# Patient Record
Sex: FEMALE | Race: BLACK OR AFRICAN AMERICAN | NOT HISPANIC OR LATINO | URBAN - METROPOLITAN AREA
[De-identification: names, ages, dates, MRNs, and addresses within clinical notes are randomized per-mention and may not be internally consistent; named-entity substitution may affect disease eponyms.]

---

## 2022-12-23 ENCOUNTER — EMERGENCY (EMERGENCY)
Facility: HOSPITAL | Age: 22
LOS: 1 days | Discharge: HOME | End: 2022-12-23
Attending: STUDENT IN AN ORGANIZED HEALTH CARE EDUCATION/TRAINING PROGRAM | Admitting: STUDENT IN AN ORGANIZED HEALTH CARE EDUCATION/TRAINING PROGRAM
Payer: COMMERCIAL

## 2022-12-23 VITALS
SYSTOLIC BLOOD PRESSURE: 105 MMHG | DIASTOLIC BLOOD PRESSURE: 67 MMHG | OXYGEN SATURATION: 97 % | TEMPERATURE: 98 F | HEART RATE: 103 BPM | WEIGHT: 162.04 LBS | RESPIRATION RATE: 17 BRPM | HEIGHT: 70 IN

## 2022-12-23 LAB
ALBUMIN SERPL ELPH-MCNC: 4.6 G/DL — SIGNIFICANT CHANGE UP (ref 3.3–5)
ALP SERPL-CCNC: 58 U/L — SIGNIFICANT CHANGE UP (ref 40–120)
ALT FLD-CCNC: 23 U/L — SIGNIFICANT CHANGE UP (ref 10–45)
ANION GAP SERPL CALC-SCNC: 8 MMOL/L — SIGNIFICANT CHANGE UP (ref 5–17)
AST SERPL-CCNC: 28 U/L — SIGNIFICANT CHANGE UP (ref 10–40)
BASOPHILS # BLD AUTO: 0.05 K/UL — SIGNIFICANT CHANGE UP (ref 0–0.2)
BASOPHILS NFR BLD AUTO: 0.5 % — SIGNIFICANT CHANGE UP (ref 0–2)
BILIRUB SERPL-MCNC: 0.6 MG/DL — SIGNIFICANT CHANGE UP (ref 0.2–1.2)
BUN SERPL-MCNC: 15 MG/DL — SIGNIFICANT CHANGE UP (ref 7–23)
CALCIUM SERPL-MCNC: 9.5 MG/DL — SIGNIFICANT CHANGE UP (ref 8.4–10.5)
CHLORIDE SERPL-SCNC: 102 MMOL/L — SIGNIFICANT CHANGE UP (ref 96–108)
CO2 SERPL-SCNC: 26 MMOL/L — SIGNIFICANT CHANGE UP (ref 22–31)
CREAT SERPL-MCNC: 0.91 MG/DL — SIGNIFICANT CHANGE UP (ref 0.5–1.3)
EGFR: 91 ML/MIN/1.73M2 — SIGNIFICANT CHANGE UP
EOSINOPHIL # BLD AUTO: 0.16 K/UL — SIGNIFICANT CHANGE UP (ref 0–0.5)
EOSINOPHIL NFR BLD AUTO: 1.7 % — SIGNIFICANT CHANGE UP (ref 0–6)
GLUCOSE SERPL-MCNC: 90 MG/DL — SIGNIFICANT CHANGE UP (ref 70–99)
HCT VFR BLD CALC: 36.4 % — SIGNIFICANT CHANGE UP (ref 34.5–45)
HGB BLD-MCNC: 12.3 G/DL — SIGNIFICANT CHANGE UP (ref 11.5–15.5)
IMM GRANULOCYTES NFR BLD AUTO: 0.4 % — SIGNIFICANT CHANGE UP (ref 0–0.9)
LYMPHOCYTES # BLD AUTO: 1.44 K/UL — SIGNIFICANT CHANGE UP (ref 1–3.3)
LYMPHOCYTES # BLD AUTO: 15 % — SIGNIFICANT CHANGE UP (ref 13–44)
MCHC RBC-ENTMCNC: 29 PG — SIGNIFICANT CHANGE UP (ref 27–34)
MCHC RBC-ENTMCNC: 33.8 GM/DL — SIGNIFICANT CHANGE UP (ref 32–36)
MCV RBC AUTO: 85.8 FL — SIGNIFICANT CHANGE UP (ref 80–100)
MONOCYTES # BLD AUTO: 0.76 K/UL — SIGNIFICANT CHANGE UP (ref 0–0.9)
MONOCYTES NFR BLD AUTO: 7.9 % — SIGNIFICANT CHANGE UP (ref 2–14)
NEUTROPHILS # BLD AUTO: 7.16 K/UL — SIGNIFICANT CHANGE UP (ref 1.8–7.4)
NEUTROPHILS NFR BLD AUTO: 74.5 % — SIGNIFICANT CHANGE UP (ref 43–77)
NRBC # BLD: 0 /100 WBCS — SIGNIFICANT CHANGE UP (ref 0–0)
PLATELET # BLD AUTO: 218 K/UL — SIGNIFICANT CHANGE UP (ref 150–400)
POTASSIUM SERPL-MCNC: 4.5 MMOL/L — SIGNIFICANT CHANGE UP (ref 3.5–5.3)
POTASSIUM SERPL-SCNC: 4.5 MMOL/L — SIGNIFICANT CHANGE UP (ref 3.5–5.3)
PROT SERPL-MCNC: 7.4 G/DL — SIGNIFICANT CHANGE UP (ref 6–8.3)
RBC # BLD: 4.24 M/UL — SIGNIFICANT CHANGE UP (ref 3.8–5.2)
RBC # FLD: 11.9 % — SIGNIFICANT CHANGE UP (ref 10.3–14.5)
SARS-COV-2 RNA SPEC QL NAA+PROBE: NEGATIVE — SIGNIFICANT CHANGE UP
SODIUM SERPL-SCNC: 136 MMOL/L — SIGNIFICANT CHANGE UP (ref 135–145)
WBC # BLD: 9.61 K/UL — SIGNIFICANT CHANGE UP (ref 3.8–10.5)
WBC # FLD AUTO: 9.61 K/UL — SIGNIFICANT CHANGE UP (ref 3.8–10.5)

## 2022-12-23 PROCEDURE — 99283 EMERGENCY DEPT VISIT LOW MDM: CPT

## 2022-12-23 PROCEDURE — 87635 SARS-COV-2 COVID-19 AMP PRB: CPT

## 2022-12-23 PROCEDURE — 80053 COMPREHEN METABOLIC PANEL: CPT

## 2022-12-23 PROCEDURE — 36415 COLL VENOUS BLD VENIPUNCTURE: CPT

## 2022-12-23 PROCEDURE — 99284 EMERGENCY DEPT VISIT MOD MDM: CPT

## 2022-12-23 PROCEDURE — 85025 COMPLETE CBC W/AUTO DIFF WBC: CPT

## 2022-12-23 RX ORDER — ACETAMINOPHEN 500 MG
975 TABLET ORAL ONCE
Refills: 0 | Status: COMPLETED | OUTPATIENT
Start: 2022-12-23 | End: 2022-12-23

## 2022-12-23 RX ADMIN — Medication 975 MILLIGRAM(S): at 14:58

## 2022-12-23 NOTE — ED ADULT NURSE NOTE - OBJECTIVE STATEMENT
Patient came to ED after witnessed seizure. Patient hit face during having seizure. Patient has hematoma on forehead, Rt upper sangeetha swelling, dried blood noted around nose. Complaints of hematoma area, face pain.   Patient states she compliance with seizure meds and PMHX of Sz.  Denies SOB, cough, CP, neck pain, back pain at this time. No broken teeth noted. Denies any other injury.  As per patients, last seizure episode was in november. Rigid C collar in place by EMS. Patient came to ED after witnessed seizure. Patient hit face during having seizure. Patient has hematoma on forehead, Rt upper sangeetha swelling, dried blood noted around nose. Complaints of hematoma area, face pain.   Patient states she compliance with seizure meds and PMHX of Sz.  Denies SOB, cough, CP, neck pain, back pain at this time. No broken teeth noted. Denies any other injury. Denies urinary discomfort.  As per patients, last seizure episode was in november. Rigid C collar in place by EMS.
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
## 2022-12-23 NOTE — ED PROVIDER NOTE - NSFOLLOWUPINSTRUCTIONS_ED_ALL_ED_FT
YOU WERE SEEN TODAY FOR A BREAKTHROUGH SEIZURE. YOUR ELECTROLYTES AND SUGAR LEVELS WERE OK, PLEASE FOLLOW UP WITH YOUR NEUROLOGIST. CONTINUE TAKING YOUR ANTISEIZURE MEDICATION AS PRESCRIBED.    Seizure    A seizure is abnormal electrical activity in the brain; the specific cause may or may not be found. Prior to a seizure you may experience a warning sensation (aura) that may include fear, nausea, dizziness, and visual changes such as flashing lights of spots. Common symptoms during the seizure may include an altered mental status, rhythmic jerking movements, drooling, grunting, loss of bladder or bowel control, or tongue biting. After a seizure, you may feel confused and sleepy.     Do not swim, drive, operate machinery, or engage in any risky activity during which a seizure could cause further injury to you or others. Teach friends and family what to do if you HAVE a seizure which includes laying you on the ground with your head on a cushion and turning you to the side to keep your breathing passages clear in case of vomiting.    SEEK IMMEDIATE MEDICAL CARE IF YOU HAVE ANY OF THE FOLLOWING SYMPTOMS: seizure lasting over 5 minutes, not waking up or persistent altered mental status after the seizure, or more frequent or worsening seizures.
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
## 2022-12-23 NOTE — ED PROVIDER NOTE - PHYSICAL EXAMINATION
CONST: nontoxic NAD speaking in full sentences  HEAD: frontal hematoma  EYES: conjunctivae clear, PERRL, EOMI  ENT: upper lip swelling, small lac to inside of upper lip  NECK: no stepoffs nontender FROM, c-collar cleared on exam  CARD: rrr   CHEST: ctab no r/r/w, no stridor/retractions/tripoding  ABD: soft, nd, nttp, no rebound/guarding  EXT: FROM, symmetric distal pulses intact  SKIN: warm, dry, no rash, no pedal edema/ttp/rash, cap refill <2sec  NEURO: a+ox3, 5/5 strength x4, gross sensation intact x4, baseline gait

## 2022-12-23 NOTE — ED PROVIDER NOTE - PATIENT PORTAL LINK FT
You can access the FollowMyHealth Patient Portal offered by [facility] by registering at the following website: [email or web address]. By joining [employer]’s FollowMyHealth portal, you will also be able to view your health information using other applications (apps) compatible with our system.

## 2022-12-23 NOTE — ED ADULT TRIAGE NOTE - CHIEF COMPLAINT QUOTE
Pt BIBEMS from work after witnessed seizure Pt struck face, dried blood noted around nose and +hematoma on forehead. Endorses compliance with seizure meds, +hx of seizures. Last seizure in november. No AC use. Denies neck or back pain, rigid c collar in place by EMS.

## 2022-12-26 DIAGNOSIS — G40.919 EPILEPSY, UNSPECIFIED, INTRACTABLE, WITHOUT STATUS EPILEPTICUS: ICD-10-CM
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
## 2022-12-26 DIAGNOSIS — Z20.822 CONTACT WITH AND (SUSPECTED) EXPOSURE TO COVID-19: ICD-10-CM

## 2022-12-26 DIAGNOSIS — S00.83XA CONTUSION OF OTHER PART OF HEAD, INITIAL ENCOUNTER: ICD-10-CM

## 2022-12-26 DIAGNOSIS — R56.9 UNSPECIFIED CONVULSIONS: ICD-10-CM
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
## 2022-12-26 DIAGNOSIS — S01.511A LACERATION WITHOUT FOREIGN BODY OF LIP, INITIAL ENCOUNTER: ICD-10-CM

## 2023-01-03 ENCOUNTER — EMERGENCY (EMERGENCY)
Facility: HOSPITAL | Age: 23
LOS: 1 days | Discharge: HOME | End: 2023-01-03
Attending: EMERGENCY MEDICINE | Admitting: EMERGENCY MEDICINE
Payer: COMMERCIAL

## 2023-01-03 VITALS
DIASTOLIC BLOOD PRESSURE: 80 MMHG | HEART RATE: 105 BPM | WEIGHT: 162.04 LBS | OXYGEN SATURATION: 97 % | TEMPERATURE: 98 F | RESPIRATION RATE: 18 BRPM | HEIGHT: 70 IN | SYSTOLIC BLOOD PRESSURE: 125 MMHG

## 2023-01-03 VITALS
HEART RATE: 84 BPM | OXYGEN SATURATION: 99 % | TEMPERATURE: 98 F | SYSTOLIC BLOOD PRESSURE: 105 MMHG | RESPIRATION RATE: 18 BRPM | DIASTOLIC BLOOD PRESSURE: 69 MMHG

## 2023-01-03 LAB
ALBUMIN SERPL ELPH-MCNC: 5.1 G/DL — HIGH (ref 3.3–5)
ALP SERPL-CCNC: 58 U/L — SIGNIFICANT CHANGE UP (ref 40–120)
ALT FLD-CCNC: 75 U/L — HIGH (ref 10–45)
ANION GAP SERPL CALC-SCNC: 16 MMOL/L — SIGNIFICANT CHANGE UP (ref 5–17)
AST SERPL-CCNC: 69 U/L — HIGH (ref 10–40)
BASOPHILS # BLD AUTO: 0.07 K/UL — SIGNIFICANT CHANGE UP (ref 0–0.2)
BASOPHILS NFR BLD AUTO: 0.7 % — SIGNIFICANT CHANGE UP (ref 0–2)
BILIRUB SERPL-MCNC: 0.7 MG/DL — SIGNIFICANT CHANGE UP (ref 0.2–1.2)
BUN SERPL-MCNC: 13 MG/DL — SIGNIFICANT CHANGE UP (ref 7–23)
CALCIUM SERPL-MCNC: 9.4 MG/DL — SIGNIFICANT CHANGE UP (ref 8.4–10.5)
CHLORIDE SERPL-SCNC: 99 MMOL/L — SIGNIFICANT CHANGE UP (ref 96–108)
CO2 SERPL-SCNC: 21 MMOL/L — LOW (ref 22–31)
CREAT SERPL-MCNC: 0.97 MG/DL — SIGNIFICANT CHANGE UP (ref 0.5–1.3)
EGFR: 84 ML/MIN/1.73M2 — SIGNIFICANT CHANGE UP
EOSINOPHIL # BLD AUTO: 0.12 K/UL — SIGNIFICANT CHANGE UP (ref 0–0.5)
EOSINOPHIL NFR BLD AUTO: 1.3 % — SIGNIFICANT CHANGE UP (ref 0–6)
GLUCOSE SERPL-MCNC: 76 MG/DL — SIGNIFICANT CHANGE UP (ref 70–99)
HCT VFR BLD CALC: 41.3 % — SIGNIFICANT CHANGE UP (ref 34.5–45)
HGB BLD-MCNC: 13.6 G/DL — SIGNIFICANT CHANGE UP (ref 11.5–15.5)
IMM GRANULOCYTES NFR BLD AUTO: 0.5 % — SIGNIFICANT CHANGE UP (ref 0–0.9)
LYMPHOCYTES # BLD AUTO: 2.73 K/UL — SIGNIFICANT CHANGE UP (ref 1–3.3)
LYMPHOCYTES # BLD AUTO: 29.2 % — SIGNIFICANT CHANGE UP (ref 13–44)
MCHC RBC-ENTMCNC: 28.9 PG — SIGNIFICANT CHANGE UP (ref 27–34)
MCHC RBC-ENTMCNC: 32.9 GM/DL — SIGNIFICANT CHANGE UP (ref 32–36)
MCV RBC AUTO: 87.7 FL — SIGNIFICANT CHANGE UP (ref 80–100)
MONOCYTES # BLD AUTO: 0.9 K/UL — SIGNIFICANT CHANGE UP (ref 0–0.9)
MONOCYTES NFR BLD AUTO: 9.6 % — SIGNIFICANT CHANGE UP (ref 2–14)
NEUTROPHILS # BLD AUTO: 5.49 K/UL — SIGNIFICANT CHANGE UP (ref 1.8–7.4)
NEUTROPHILS NFR BLD AUTO: 58.7 % — SIGNIFICANT CHANGE UP (ref 43–77)
NRBC # BLD: 0 /100 WBCS — SIGNIFICANT CHANGE UP (ref 0–0)
PLATELET # BLD AUTO: 257 K/UL — SIGNIFICANT CHANGE UP (ref 150–400)
POTASSIUM SERPL-MCNC: 4.3 MMOL/L — SIGNIFICANT CHANGE UP (ref 3.5–5.3)
POTASSIUM SERPL-SCNC: 4.3 MMOL/L — SIGNIFICANT CHANGE UP (ref 3.5–5.3)
PROT SERPL-MCNC: 8.3 G/DL — SIGNIFICANT CHANGE UP (ref 6–8.3)
RBC # BLD: 4.71 M/UL — SIGNIFICANT CHANGE UP (ref 3.8–5.2)
RBC # FLD: 11.9 % — SIGNIFICANT CHANGE UP (ref 10.3–14.5)
SODIUM SERPL-SCNC: 136 MMOL/L — SIGNIFICANT CHANGE UP (ref 135–145)
WBC # BLD: 9.36 K/UL — SIGNIFICANT CHANGE UP (ref 3.8–10.5)
WBC # FLD AUTO: 9.36 K/UL — SIGNIFICANT CHANGE UP (ref 3.8–10.5)

## 2023-01-03 PROCEDURE — 85025 COMPLETE CBC W/AUTO DIFF WBC: CPT

## 2023-01-03 PROCEDURE — 99283 EMERGENCY DEPT VISIT LOW MDM: CPT

## 2023-01-03 PROCEDURE — 82962 GLUCOSE BLOOD TEST: CPT

## 2023-01-03 PROCEDURE — 99284 EMERGENCY DEPT VISIT MOD MDM: CPT

## 2023-01-03 PROCEDURE — 36415 COLL VENOUS BLD VENIPUNCTURE: CPT

## 2023-01-03 PROCEDURE — 80053 COMPREHEN METABOLIC PANEL: CPT

## 2023-01-03 NOTE — ED PROVIDER NOTE - CARE PROVIDER_API CALL
Ez Mcgill)  Neurology  [address] [place] [address]  Phone: [phone]  Fax: [phone]  Follow Up Time:

## 2023-01-03 NOTE — ED PROVIDER NOTE - CLINICAL SUMMARY MEDICAL DECISION MAKING FREE TEXT BOX
seizure disorder- 2nd seizure visit in 11 days- rec increasing keppra to 4 gm daily- px agrees  d/w Dr Adams- rec increasing keppra- they will fu with her tomorrow

## 2023-01-03 NOTE — ED ADULT NURSE NOTE - NSIMPLEMENTINTERV_GEN_ALL_ED
Implemented All Fall Risk Interventions:  [place] to call system. Call bell, personal items and telephone within reach. Instruct patient to call for assistance. Room bathroom lighting operational. Non-slip footwear when patient is off stretcher. Physically safe environment: no spills, clutter or unnecessary equipment. Stretcher in lowest position, wheels locked, appropriate side rails in place. Provide visual cue, wrist band, yellow gown, etc. Monitor gait and stability. Monitor for mental status changes and reorient to person, place, and time. Review medications for side effects contributing to fall risk. Reinforce activity limits and safety measures with patient and family.

## 2023-01-03 NOTE — ED PROVIDER NOTE - CONSTITUTIONAL, MLM
Well appearing, awake, alert, oriented to person, place, time/situation and in no apparent distress- healing bruise under R eye normal...
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
## 2023-01-03 NOTE — ED PROVIDER NOTE - CARE PROVIDERS DIRECT ADDRESSES
[email or web address]

## 2023-01-03 NOTE — ED PROVIDER NOTE - PATIENT PORTAL LINK FT
You can access the FollowMyHealth Patient Portal offered by [facility] by registering at the following website: [email or web address]. By joining [employer]’s FollowMyHealth portal, you will also be able to view your health information using other applications (apps) compatible with our system.

## 2023-01-03 NOTE — ED ADULT TRIAGE NOTE - CHIEF COMPLAINT QUOTE
BIBEMS, as per EMS patient had a seizure. last about a minute and was unresponsive for 5 mins. Patient currently awake and alert. History of seizures, last one was 3 weeks ago. Patient taking Keppra.

## 2023-01-03 NOTE — ED PROVIDER NOTE - NSFOLLOWUPINSTRUCTIONS_ED_ALL_ED_FT
Seizure, Adult      A seizure is a sudden burst of abnormal electrical and chemical activity in the brain. Seizures usually last from 30 seconds to 2 minutes. The abnormal activity temporarily interrupts normal brain function.    Many types of seizures can affect adults. A seizure can cause many different symptoms depending on where in the brain it starts.      What are the causes?    Common causes of this condition include:  •Fever or infection.      •Brain injury, head trauma, bleeding in the brain, or a brain tumor.      •Low levels of blood sugar or salt (sodium).      •Kidney problems or liver problems.      •Metabolic disorders or other conditions that are passed from parent to child (are inherited).      •Reaction to a substance, such as a drug or a medicine, or suddenly stopping the use of a substance (withdrawal).      •A stroke.      •Developmental disorders such as autism spectrum disorder or cerebral palsy.      In some cases, the cause of a seizure may not be known. Some people who have a seizure never have another one. A person who has repeated seizures over time without a clear cause has a condition called epilepsy.      What increases the risk?    You are more likely to develop this condition if:  •You have a family history of epilepsy.      •You have had a tonic–clonic seizure before. This type of seizure causes tightening (contraction) of the muscles of the whole body and loss of consciousness.      •You have a history of head trauma, lack of oxygen at birth, or strokes.        What are the signs or symptoms?    There are many different types of seizures. The symptoms vary depending on the type of seizure you have. Symptoms occur during the seizure. They may also occur before a seizure (aura) and after a seizure (postictal). Symptoms may include the following:    Symptoms during a seizure     •Uncontrollable shaking (convulsions) with fast, jerky movements of muscles.      •Stiffening of the body.      •Breathing problems.      •Confusion, staring, or unresponsiveness.      •Head nodding, eye blinking or fluttering, or rapid eye movements.      •Drooling, grunting, or making clicking sounds with your mouth.      •Loss of bladder control and bowel control.      Symptoms before a seizure     •Fear or anxiety.      •Nausea.    •Vertigo. This is a feeling like:  •You are moving when you are not.      •Your surroundings are moving when they are not.        •Déjà vu. This is a feeling of having seen or heard something before.      •Odd tastes or smells.      •Changes in vision, such as seeing flashing lights or spots.      Symptoms after a seizure     •Confusion.      •Sleepiness.      •Headache.      •Sore muscles.        How is this diagnosed?    This condition may be diagnosed based on:  •A description of your symptoms. Video of your seizures can be helpful.      •Your medical history.      •A physical exam.      You may also have tests, including:  •Blood tests.      •CT scan.      •MRI.      •Electroencephalogram (EEG). This test measures electrical activity in the brain. An EEG can predict whether seizures will return.      •A spinal tap, also called a lumbar puncture. This is the removal and testing of fluid that surrounds the brain and spinal cord.        How is this treated?    Most seizures will stop on their own in less than 5 minutes, and no treatment is needed. Seizures that last longer than 5 minutes will usually need treatment.    Seizures may be treated with:  •Medicines given through an IV.      •Avoiding known triggers, such as medicines that you take for another condition.      •Medicines to control seizures or prevent future seizures (antiepileptics), if epilepsy caused your seizures.      •Medical devices to prevent and control seizures.      •Surgery to stop seizures or to reduce how often seizures happen, if you have epilepsy that does not respond to medicines.      •A diet low in carbohydrates and high in fat (ketogenic diet).        Follow these instructions at home:    Medicines     •Take over-the-counter and prescription medicines only as told by your health care provider.      •Avoid any substances that may prevent your medicine from working properly, such as alcohol.      Activity     •Follow instructions about activities, such as driving or swimming, that would be dangerous if you had another seizure. Wait until your health care provider says it is safe to do them.      •If you live in the [place] check with your local department of motor vehicles (DMV) to find out about local driving laws. Each state has specific rules about when you can legally drive again.      •Get enough rest. Lack of sleep can make seizures more likely to occur.        Educating others    •Teach friends and family what to do if you have a seizure. They should:  •Help you get down to the ground, to prevent a fall.      •Cushion your head and move items away from your body.      •Loosen any tight clothing around your neck.      •Turn you on your side. If you vomit, this helps keep your airway clear.      •Know whether or not you need emergency care.      •Stay with you until you recover.      •Also, tell them what not to do if you have a seizure. Tell them:  •They should not hold you down. Holding you down will not stop the seizure.      •They should not put anything in your mouth.        General instructions     •Avoid anything that has ever triggered a seizure for you.      •Keep a seizure diary. Record what you remember about each seizure, especially anything that might have triggered it.      •Keep all follow-up visits. This is important.        Contact a health care provider if:    •You have another seizure or seizures. Call each time you have a seizure.      •Your seizure pattern changes.      •You continue to have seizures with treatment.      •You have symptoms of an infection or illness. Either of these might increase your risk of having a seizure.      •You are unable to take your medicine.        Get help right away if:  •You have:  •A seizure that does not stop after 5 minutes.      •Several seizures in a row without a complete recovery between seizures.      •A seizure that makes it harder to breathe.      •A seizure that leaves you unable to speak or use a part of your body.        •You do not wake up right away after a seizure.      •You injure yourself during a seizure.      •You have confusion or pain right after a seizure.      These symptoms may represent a serious problem that is an emergency. Do not wait to see if the symptoms will go away. Get medical help right away. Call your local emergency services (911 in the [place] Do not drive yourself to the hospital.       Summary    •Seizures are caused by abnormal electrical and chemical activity in the brain. The activity disrupts normal brain function and can cause various symptoms.      •Seizures have many causes, including illness, head injuries, low levels of blood sugar or salt, and certain conditions.      •Most seizures will stop on their own in less than 5 minutes. Seizures that last longer than 5 minutes are a medical emergency and need treatment right away.      •Many medicines are used to treat seizures. Take over-the-counter and prescription medicines only as told by your health care provider.      This information is not intended to replace advice given to you by your health care provider. Make sure you discuss any questions you have with your health care provider.      Document Revised: 06/25/2021 Document Reviewed: 06/25/2021    [employer] Patient Education © 2022 [employer]

## 2023-01-05 DIAGNOSIS — R56.9 UNSPECIFIED CONVULSIONS: ICD-10-CM

## 2023-01-05 PROBLEM — Z00.00 ENCOUNTER FOR PREVENTIVE HEALTH EXAMINATION: Status: ACTIVE | Noted: 2023-01-05

## 2023-01-12 ENCOUNTER — NON-APPOINTMENT (OUTPATIENT)
Age: 23
End: 2023-01-12

## 2023-01-12 ENCOUNTER — APPOINTMENT (OUTPATIENT)
Dept: NEUROLOGY | Facility: CLINIC | Age: 23
End: 2023-01-12
Payer: COMMERCIAL

## 2023-01-12 VITALS
WEIGHT: 159 LBS | TEMPERATURE: 98.6 F | BODY MASS INDEX: 22.26 KG/M2 | HEIGHT: 70.87 IN | DIASTOLIC BLOOD PRESSURE: 88 MMHG | OXYGEN SATURATION: 100 % | HEART RATE: 97 BPM | SYSTOLIC BLOOD PRESSURE: 118 MMHG

## 2023-01-12 DIAGNOSIS — Z78.9 OTHER SPECIFIED HEALTH STATUS: ICD-10-CM

## 2023-01-12 DIAGNOSIS — Z63.5 DISRUPTION OF FAMILY BY SEPARATION AND DIVORCE: ICD-10-CM

## 2023-01-12 DIAGNOSIS — R56.9 UNSPECIFIED CONVULSIONS: ICD-10-CM

## 2023-01-12 DIAGNOSIS — F17.210 NICOTINE DEPENDENCE, CIGARETTES, UNCOMPLICATED: ICD-10-CM

## 2023-01-12 PROCEDURE — 99205 OFFICE O/P NEW HI 60 MIN: CPT

## 2023-01-12 SDOH — SOCIAL STABILITY - SOCIAL INSECURITY: DISRUPTION OF FAMILY BY SEPARATION AND DIVORCE: Z63.5

## 2023-01-18 NOTE — ASSESSMENT
[FreeTextEntry1] : 1) Recommending inpatient workup for VEEG, MRI, and med changes, if patient is willing to extend her stay in [place]\par \par \par \par Treatment, side effects and dosages were discussed in detail.\par \par Risk of allergic reactions, mood changes, lab etc discussed in details.\par \par Seizure precautions discussed in detail including restrictions on driving, machinery, etc. \par \par Seizure triggers including ETOH intake, drugs discussed in detail\par \par Patient questions answered.\par

## 2023-01-18 NOTE — REVIEW OF SYSTEMS
[As Noted in HPI] : as noted in HPI [Seizures] : convulsions [Anxiety] : anxiety [Emotional Problems] : emotional problems [Fever] : no fever [Chills] : no chills [Feeling Poorly] : not feeling poorly [Feeling Tired] : not feeling tired [Chest Pain] : no chest pain [Palpitations] : no palpitations [Shortness Of Breath] : no shortness of breath [Abdominal Pain] : no abdominal pain [de-identified] : seizures

## 2023-01-18 NOTE — HISTORY OF PRESENT ILLNESS
[FreeTextEntry1] : Patient is a 22 year old female presenting with seizures.\par \par History of first seizure in July 2019 in [place]. Hospitalized in coma for 3 days, diagnosed with "high emotional stress seizures". Put on Keppra, unclear initial dose.\par \par Officially diagnosed with epilepsy in 2021 at home in [place] by Dr. Jeff Watkins - pt still follows with him. He increased Keppra to 2000mg ER nightly. Seizures poorly controlled, pt notes "many" events over the years, especially if she misses a dose of Keppra. Inpatient EEG in [place] in March 2022 and July 2022. EEG showed LT slowing with 3 events recorded, but report unclear. Pt to email official reports. No MRI done.\par \par Moved to [place] in September 2022, works at a [employer] on the [facility]. Pt notes >10 seizures since moving to the [facility]. Had a seizure on 12/10, possibly related to emotional issues with her partner who she was living with at the time. Two other seizures on 12/23 and 1/3, both at work. The first witnessed on security camera and the second by a coworker. Both times pt sent to [facility] ED, pt discharged and recommended following up with neurologist. Pt called Dr. Watkins, who recommended increasing Keppra to 4000mg, but pt refused.\par \par Pt experiences auras before every seizure, describing them as a "flush" in her head spreading to her whole body, lasting several minutes. \par \par Sleep has been "off" since seizure on 12/10, but pt attributes this in part to issues with her partner.\par \par PMH significant for HSV diagnosed several months after seizure in 2019.\par \par Meds:\par Keppra 2000mg ER nightly\par \par Previously smoked marijuana daily, but stopped this month due to possible connection to her seizures. Drinks an occasional glass of wine each week. \par \par Pt's 1st cousin may have possible seizure disorder - currently seeking treatment / diagnosis. Mom did not know she was pregnant with pt until 3rd month, but otherwise normal pregnancy and delivery. \par \par Pt was previously living with partner, but moved out to live with a friend. She is considering moving back home to [place] at the end of the month. \par

## 2023-01-18 NOTE — DISCUSSION/SUMMARY
[FreeTextEntry1] : 22 year old female presenting with uncontrolled seizures, etiology unknown. EEG in 2022 with LT slowing and 3 events recorded, but report unclear. No imaging.

## 2023-01-18 NOTE — REVIEW OF SYSTEMS
[As Noted in HPI] : as noted in HPI [Seizures] : convulsions [Anxiety] : anxiety [Emotional Problems] : emotional problems [Fever] : no fever [Chills] : no chills [Feeling Poorly] : not feeling poorly [Feeling Tired] : not feeling tired [Chest Pain] : no chest pain [Palpitations] : no palpitations [Shortness Of Breath] : no shortness of breath [Abdominal Pain] : no abdominal pain [de-identified] : seizures

## 2023-01-18 NOTE — PHYSICAL EXAM
[General Appearance - Alert] : alert [General Appearance - In No Acute Distress] : in no acute distress [General Appearance - Well-Appearing] : healthy appearing [Oriented To Time, Place, And Person] : oriented to person, place, and time [Cranial Nerves Optic (II)] : visual acuity intact bilaterally,  visual fields full to confrontation, pupils equal round and reactive to light [Cranial Nerves Oculomotor (III)] : extraocular motion intact [Motor Strength] : muscle strength was normal in all four extremities [Sensation Tactile Decrease] : light touch was intact [Abnormal Walk] : normal gait [Balance] : balance was intact [2+] : Ankle jerk left 2+ [PERRL With Normal Accommodation] : pupils were equal in size, round, reactive to light, with normal accommodation [Extraocular Movements] : extraocular movements were intact [Full Visual Field] : full visual field [Motor Handedness Right-Handed] : the patient is right hand dominant [Paresis Pronator Drift Right-Sided] : no pronator drift on the right [Paresis Pronator Drift Left-Sided] : no pronator drift on the left [Motor Strength Upper Extremities Right] : strength was normal in the right upper extremity [Motor Strength Upper Extremities Left] : strength was normal in the left upper extremity [Motor Strength Lower Extremities Right] : strength was normal in the right lower extremity [Motor Strength Lower Extremities Left] : strength was normal in the left lower extremity

## 2023-01-18 NOTE — HISTORY OF PRESENT ILLNESS
[FreeTextEntry1] : Patient is a 22 year old female presenting with seizures.\par \par History of first seizure in July 2019 in [place]. Hospitalized in coma for 3 days, diagnosed with "high emotional stress seizures". Put on Keppra, unclear initial dose.\par \par Officially diagnosed with epilepsy in 2021 at home in [place] by Dr. Jeff Watkins - pt still follows with him. He increased Keppra to 2000mg ER nightly. Seizures poorly controlled, pt notes "many" events over the years, especially if she misses a dose of Keppra. Inpatient EEG in [place] in March 2022 and July 2022. EEG showed LT slowing with 3 events recorded, but report unclear. Pt to email official reports. No MRI done.\par \par Moved to [place] in September 2022, works at a [employer] on the [facility]. Pt notes >10 seizures since moving to the [facility]. Had a seizure on 12/10, possibly related to emotional issues with her partner who she was living with at the time. Two other seizures on 12/23 and 1/3, both at work. The first witnessed on security camera and the second by a coworker. Both times pt sent to [facility] ED, pt discharged and recommended following up with neurologist. Pt called Dr. Watkins, who recommended increasing Keppra to 4000mg, but pt refused.\par \par Pt experiences auras before every seizure, describing them as a "flush" in her head spreading to her whole body, lasting several minutes. \par \par Sleep has been "off" since seizure on 12/10, but pt attributes this in part to issues with her partner.\par \par PMH significant for HSV diagnosed several months after seizure in 2019.\par \par Meds:\par Keppra 2000mg ER nightly\par \par Previously smoked marijuana daily, but stopped this month due to possible connection to her seizures. Drinks an occasional glass of wine each week. \par \par Pt's 1st cousin may have possible seizure disorder - currently seeking treatment / diagnosis. Mom did not know she was pregnant with pt until 3rd month, but otherwise normal pregnancy and delivery. \par \par Pt was previously living with partner, but moved out to live with a friend. She is considering moving back home to [place] at the end of the month. \par

## 2023-01-29 ENCOUNTER — TRANSCRIPTION ENCOUNTER (OUTPATIENT)
Age: 23
End: 2023-01-29

## 2023-02-13 LAB — SARS-COV-2 N GENE NPH QL NAA+PROBE: DETECTED

## 2023-02-20 ENCOUNTER — FORM ENCOUNTER (OUTPATIENT)
Age: 23
End: 2023-02-20

## 2023-02-21 ENCOUNTER — INPATIENT (INPATIENT)
Facility: HOSPITAL | Age: 23
LOS: 2 days | Discharge: HOME | DRG: 101 | End: 2023-02-24
Attending: PSYCHIATRY & NEUROLOGY | Admitting: PSYCHIATRY & NEUROLOGY
Payer: COMMERCIAL

## 2023-02-21 VITALS
RESPIRATION RATE: 16 BRPM | WEIGHT: 161.82 LBS | HEART RATE: 80 BPM | SYSTOLIC BLOOD PRESSURE: 119 MMHG | HEIGHT: 70 IN | TEMPERATURE: 98 F | DIASTOLIC BLOOD PRESSURE: 80 MMHG | OXYGEN SATURATION: 99 %

## 2023-02-21 LAB
ALBUMIN SERPL ELPH-MCNC: 4.1 G/DL — SIGNIFICANT CHANGE UP (ref 3.3–5)
ALP SERPL-CCNC: 64 U/L — SIGNIFICANT CHANGE UP (ref 40–120)
ALT FLD-CCNC: 20 U/L — SIGNIFICANT CHANGE UP (ref 10–45)
ANION GAP SERPL CALC-SCNC: 7 MMOL/L — SIGNIFICANT CHANGE UP (ref 5–17)
AST SERPL-CCNC: 27 U/L — SIGNIFICANT CHANGE UP (ref 10–40)
BASOPHILS # BLD AUTO: 0.05 K/UL — SIGNIFICANT CHANGE UP (ref 0–0.2)
BASOPHILS NFR BLD AUTO: 0.6 % — SIGNIFICANT CHANGE UP (ref 0–2)
BILIRUB SERPL-MCNC: 0.8 MG/DL — SIGNIFICANT CHANGE UP (ref 0.2–1.2)
BUN SERPL-MCNC: 5 MG/DL — LOW (ref 7–23)
CALCIUM SERPL-MCNC: 9.5 MG/DL — SIGNIFICANT CHANGE UP (ref 8.4–10.5)
CHLORIDE SERPL-SCNC: 105 MMOL/L — SIGNIFICANT CHANGE UP (ref 96–108)
CO2 SERPL-SCNC: 28 MMOL/L — SIGNIFICANT CHANGE UP (ref 22–31)
CREAT SERPL-MCNC: 0.77 MG/DL — SIGNIFICANT CHANGE UP (ref 0.5–1.3)
EGFR: 111 ML/MIN/1.73M2 — SIGNIFICANT CHANGE UP
EOSINOPHIL # BLD AUTO: 0.12 K/UL — SIGNIFICANT CHANGE UP (ref 0–0.5)
EOSINOPHIL NFR BLD AUTO: 1.4 % — SIGNIFICANT CHANGE UP (ref 0–6)
GLUCOSE SERPL-MCNC: 111 MG/DL — HIGH (ref 70–99)
HCT VFR BLD CALC: 41.8 % — SIGNIFICANT CHANGE UP (ref 34.5–45)
HGB BLD-MCNC: 13.7 G/DL — SIGNIFICANT CHANGE UP (ref 11.5–15.5)
IMM GRANULOCYTES NFR BLD AUTO: 0.3 % — SIGNIFICANT CHANGE UP (ref 0–0.9)
LYMPHOCYTES # BLD AUTO: 2.13 K/UL — SIGNIFICANT CHANGE UP (ref 1–3.3)
LYMPHOCYTES # BLD AUTO: 24.3 % — SIGNIFICANT CHANGE UP (ref 13–44)
MAGNESIUM SERPL-MCNC: 1.9 MG/DL — SIGNIFICANT CHANGE UP (ref 1.6–2.6)
MCHC RBC-ENTMCNC: 29 PG — SIGNIFICANT CHANGE UP (ref 27–34)
MCHC RBC-ENTMCNC: 32.8 GM/DL — SIGNIFICANT CHANGE UP (ref 32–36)
MCV RBC AUTO: 88.6 FL — SIGNIFICANT CHANGE UP (ref 80–100)
MONOCYTES # BLD AUTO: 0.41 K/UL — SIGNIFICANT CHANGE UP (ref 0–0.9)
MONOCYTES NFR BLD AUTO: 4.7 % — SIGNIFICANT CHANGE UP (ref 2–14)
NEUTROPHILS # BLD AUTO: 6.01 K/UL — SIGNIFICANT CHANGE UP (ref 1.8–7.4)
NEUTROPHILS NFR BLD AUTO: 68.7 % — SIGNIFICANT CHANGE UP (ref 43–77)
NRBC # BLD: 0 /100 WBCS — SIGNIFICANT CHANGE UP (ref 0–0)
PHOSPHATE SERPL-MCNC: 3.3 MG/DL — SIGNIFICANT CHANGE UP (ref 2.5–4.5)
PLATELET # BLD AUTO: 228 K/UL — SIGNIFICANT CHANGE UP (ref 150–400)
POTASSIUM SERPL-MCNC: 4.3 MMOL/L — SIGNIFICANT CHANGE UP (ref 3.5–5.3)
POTASSIUM SERPL-SCNC: 4.3 MMOL/L — SIGNIFICANT CHANGE UP (ref 3.5–5.3)
PROT SERPL-MCNC: 7 G/DL — SIGNIFICANT CHANGE UP (ref 6–8.3)
RBC # BLD: 4.72 M/UL — SIGNIFICANT CHANGE UP (ref 3.8–5.2)
RBC # FLD: 11.7 % — SIGNIFICANT CHANGE UP (ref 10.3–14.5)
SODIUM SERPL-SCNC: 140 MMOL/L — SIGNIFICANT CHANGE UP (ref 135–145)
WBC # BLD: 8.75 K/UL — SIGNIFICANT CHANGE UP (ref 3.8–10.5)
WBC # FLD AUTO: 8.75 K/UL — SIGNIFICANT CHANGE UP (ref 3.8–10.5)

## 2023-02-21 PROCEDURE — 95816 EEG AWAKE AND DROWSY: CPT | Mod: 26

## 2023-02-21 PROCEDURE — 93010 ELECTROCARDIOGRAM REPORT: CPT

## 2023-02-21 PROCEDURE — 70551 MRI BRAIN STEM W/O DYE: CPT | Mod: 26

## 2023-02-21 PROCEDURE — 76377 3D RENDER W/INTRP POSTPROCES: CPT | Mod: 26

## 2023-02-21 RX ORDER — ENOXAPARIN SODIUM 100 MG/ML
40 INJECTION SUBCUTANEOUS EVERY 24 HOURS
Refills: 0 | Status: DISCONTINUED | OUTPATIENT
Start: 2023-02-21 | End: 2023-02-24

## 2023-02-21 RX ORDER — INFLUENZA VIRUS VACCINE 15; 15; 15; 15 UG/.5ML; UG/.5ML; UG/.5ML; UG/.5ML
0.5 SUSPENSION INTRAMUSCULAR ONCE
Refills: 0 | Status: DISCONTINUED | OUTPATIENT
Start: 2023-02-21 | End: 2023-02-24

## 2023-02-21 RX ORDER — IBUPROFEN 200 MG
600 TABLET ORAL EVERY 6 HOURS
Refills: 0 | Status: DISCONTINUED | OUTPATIENT
Start: 2023-02-21 | End: 2023-02-24

## 2023-02-21 RX ORDER — LANOLIN ALCOHOL/MO/W.PET/CERES
3 CREAM (GRAM) TOPICAL AT BEDTIME
Refills: 0 | Status: DISCONTINUED | OUTPATIENT
Start: 2023-02-21 | End: 2023-02-24

## 2023-02-21 RX ORDER — LEVETIRACETAM 250 MG/1
1000 TABLET, FILM COATED ORAL ONCE
Refills: 0 | Status: COMPLETED | OUTPATIENT
Start: 2023-02-21 | End: 2023-02-21

## 2023-02-21 RX ADMIN — ENOXAPARIN SODIUM 40 MILLIGRAM(S): 100 INJECTION SUBCUTANEOUS at 19:12

## 2023-02-21 RX ADMIN — Medication 600 MILLIGRAM(S): at 22:50

## 2023-02-21 RX ADMIN — Medication 600 MILLIGRAM(S): at 22:20

## 2023-02-21 RX ADMIN — LEVETIRACETAM 1000 MILLIGRAM(S): 250 TABLET, FILM COATED ORAL at 19:12

## 2023-02-21 NOTE — H&P ADULT - HISTORY OF PRESENT ILLNESS
22y F [facility] epilepsy (2021), HSV, presents for workup of uncontrolled seizures.  Ms. Reyes's first seizure was in July 2019 attributed to be 2/2 emotional stress in prior marriage after which she was intubated and sedated for 3 days and was started on Keppra (500mg BID). She was officially diagnosed with epilepsy in 2021 after an EEG study with Dr. Jeff Watkins in [place] at that time her keppra was increased to 2000mg ER qhs as she would often forget the BID dosing and still had seizures. She moved to [place]  9/2022 during which time underwent more significant life-stressors, would sometimes forget AED. Had 10 total seizures between September and December. In December 2022 had seizure witnessed on security footage at work: she felt unwell, took a nap and appeared to wake up, stretch arm then shook her body. She was brought to [facility] ED with recommendation to see her outpatient neuro in [place]--she spoke with him over the phone recommended to increase her Keppra to 4000mg BID but she declined.     Last seizure was 2/16. She has missed four keppra doses in the past 3 weeks. Her seizures are described by her partner as staring episodes with stuttered or slurred speech ?mouth chewing, biting tongue that occasional may progress to full body convulsion. Her aura is a full body "euphoric" flushing and lightheaded sensation.     She presents to [facility] to characterize if she truly has epilepsy, or if these seizures are provoked from stress. Denied recent illnesses. Endorsed rash on scalp related to hair products, today is her first day of menses. Denied lifetime tobacco use, endorsed 1-2 etoh per month, and marijuana every 4 months. Denied fever, chills, body weakness, visual disturbances, bugbites/tickbites, international travel.         PMHx:   PSHx: None  Meds: See med rec  Allergies: Cats  Social: see below   22y F [facility] epilepsy (2021), HSV, presents for workup of uncontrolled seizures.  Ms. Reyes's first seizure was in July 2019 attributed to be 2/2 emotional stress in prior marriage after which she was intubated and sedated for 3 days and was started on Keppra (500mg BID). She was officially diagnosed with epilepsy in 2021 after an EEG study with Dr. Jeff Watkins in [place] at that time her keppra was increased to 2000mg ER qhs as she would often forget the BID dosing and still had seizures. She moved to [place]  9/2022 during which time underwent more significant life-stressors, would sometimes forget AED. Had 10 total seizures between September and December. In December 2022 had seizure witnessed on security footage at work: she felt unwell, took a nap and appeared to wake up, stretch arm then shook her body. She was brought to [facility] ED with recommendation to see her outpatient neuro in [place]--she spoke with him over the phone recommended to increase her Keppra to 4000mg BID but she declined.     Last seizure was 2/16. She has missed four keppra doses in the past 3 weeks. Her seizures are described by her partner as staring episodes with stuttered or slurred speech ?mouth chewing, biting tongue that occasional may progress to full body convulsion. Her aura is a full body "euphoric" flushing and lightheaded sensation.     She presents to [facility] to characterize if she truly has epilepsy, or if these seizures are provoked from stress. Last Keppra dose was 2/19 evening (in preparation for this admission). Denied recent illnesses. Endorsed rash on scalp related to hair products, today is her first day of menses. Denied lifetime tobacco use, endorsed 1-2 etoh per month, and marijuana every 4 months. Denied fever, chills, body weakness, visual disturbances, bugbites/tickbites, international travel.         PMHx:   PSHx: None  Meds: See med rec  Allergies: Cats  Social: see below

## 2023-02-21 NOTE — PATIENT PROFILE ADULT - FALL HARM RISK - UNIVERSAL INTERVENTIONS
Bed in lowest position, wheels locked, appropriate side rails in place/Call bell, personal items and telephone in reach/Instruct patient to call for assistance before getting out of bed or chair/Non-slip footwear when patient is out of bed/[place] to call system/Physically safe environment - no spills, clutter or unnecessary equipment/Purposeful Proactive Rounding/Room/bathroom lighting operational, light cord in reach

## 2023-02-21 NOTE — H&P ADULT - NSHPSOCIALHISTORY_GEN_ALL_CORE
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 Lives in [place] with partner and several roommates, feels safe at home.

## 2023-02-21 NOTE — H&P ADULT - ATTENDING COMMENTS
Patient seen and examined by me on 2/22/2023.    22-year-old woman who presents for characterization of suspected seizures.  Episodes consist of feeling like she is on a roller coaster, fear, inability to speak, followed by loss of consciousness, shaking, and tongue biting.  EEG is normal thus far.  Will hold Keppra and continue recording.   Ideally we would like to record one of her typical seizure episodes, but recording interictal discharges in order to confirm and localize  her seizures would be helpful as well.

## 2023-02-21 NOTE — H&P ADULT - ASSESSMENT
22y F [facility] epilepsy (2021), HSV, presents as elective admission to EMU for characterization of breakthrough seizures. Seizures are most likely related to medication nonadherance, psychosocial stress.     Plan  #Seizure Characterization  - Admit to EMU  - vEEG x 72 hours  - f/u Keppra level  - Switch keppra to 1000mg once tonight  - Will decide on Keppra dosing after overnight EEG (hold morning dose)   - Seizure and fall  - Pending MRI Brain noncontrast   - Maintain Mag>2    #Misc  Fluids: None  Electrolytes: replete as necessary, K>4, Mg>2  Nutrition: Regular  Bowel Regimen: None  DVT ppx: Lovenox 40mg  GI ppx: None  Code: Full  Disposition: EMU     22y F [facility] epilepsy (2021), HSV (2020) presents for characterization of breakthrough seizures as elective admission to EMU. Seizures are most likely related to medication nonadherence psychosocial stress. Pending EEG to assess seizure risk and possible medication reduction.     Plan  #Seizure Characterization  - Admit to EMU  - vEEG x 72 hours  - f/u Keppra level  - Switch keppra to 1000mg once tonight  - Will decide on Keppra dosing after overnight EEG (hold morning dose)   - Pending MRI Brain noncontrast   - Maintain Mag>2  - Seizure and fall precautions  - Ativan 2mg IVP for seizure >2 min    #Misc  Fluids: None  Electrolytes: replete as necessary, K>4, Mg>2  Nutrition: Regular  Bowel Regimen: None  DVT ppx: Lovenox 40mg  GI ppx: None  Code: Full  Disposition: EMU

## 2023-02-21 NOTE — H&P ADULT - NSHPPHYSICALEXAM_GEN_ALL_CORE
T(C): 36.9 (02-21-23 @ 11:42), Max: 36.9 (02-21-23 @ 11:42)  HR: 80 (02-21-23 @ 11:42) (80 - 80)  BP: 119/80 (02-21-23 @ 11:42) (119/80 - 119/80)  RR: 16 (02-21-23 @ 11:42) (16 - 16)  SpO2: 99% (02-21-23 @ 11:42) (99% - 99%)    CONSTITUTIONAL: Well groomed, no apparent distress  EYES: PERRLA and symmetric, EOMI, No conjunctival or scleral injection, non-icteric  ENMT: Oral mucosa with moist membranes. Normal dentition; no pharyngeal injection or exudates             NECK: Supple, symmetric and without tracheal deviation   RESP: No respiratory distress, no use of accessory muscles; CTA b/l, no WRR  CV: RRR, +S1S2, no MRG; no JVD; no peripheral edema  GI: Soft, NT, ND, no rebound, no guarding; no palpable masses; no hepatosplenomegaly; no hernia palpated  LYMPH: No cervical LAD or tenderness; no axillary LAD or tenderness; no inguinal LAD or tenderness  MSK: Normal gait; No digital clubbing or cyanosis; examination of the (head/neck/spine/ribs/pelvis, RUE, LUE, RLE, LLE) without misalignment,            Normal ROM without pain, no spinal tenderness, normal muscle strength/tone  SKIN: Erythematous rash on posterior scalp    Neurologic:     -Mental Status: AAOx3. Speech is fluent with intact naming, repetition, and comprehension, no dysarthria. Recent and remote memory intact. Follows commands. Attention/concentration intact. Fund of knowledge appropriate.     -Cranial Nerves:          II: Visual fields are full to confrontation.          III, IV, VI: EOMI without nystagmus. PERRL b/l          V:  Facial sensation V1-V3 equal and intact           VII: Face is symmetric with normal eye closure and smile          VIII: Hearing is bilaterally intact to finger rub          IX, X: Uvula is midline and soft palate rises symmetrically          XI: Head turning and shoulder shrug are intact.          XII: Tongue protrudes midline     -Motor: Normal bulk and tone. Strength bilateral upper extremity 5/5, bilateral lower extremities 5/5.                     No pronator drift. Rapid alternating movements intact and symmetric     -Sensation: Intact to light touch bilaterally. No neglect or extinction on double simultaneous testing.     -Coordination: No dysmetria on finger-to-nose and heel-to-shin bilaterally     -Reflexes: 1+ Biceps 1+ BR 1+ Patellar 1+ Ankle. Downgoing toes bilaterally      -Gait: Narrow gait and steady T(C): 36.9 (02-21-23 @ 11:42), Max: 36.9 (02-21-23 @ 11:42)  HR: 80 (02-21-23 @ 11:42) (80 - 80)  BP: 119/80 (02-21-23 @ 11:42) (119/80 - 119/80)  RR: 16 (02-21-23 @ 11:42) (16 - 16)  SpO2: 99% (02-21-23 @ 11:42) (99% - 99%)    CONSTITUTIONAL: Well groomed, no apparent distress  EYES: PERRLA and symmetric, EOMI, No conjunctival or scleral injection, non-icteric  ENMT: Oral mucosa with moist membranes. Normal dentition; no pharyngeal injection or exudates             NECK: Supple, symmetric and without tracheal deviation   RESP: No respiratory distress, no use of accessory muscles; CTA b/l, no WRR  CV: RRR, +S1S2, no MRG; no JVD; no peripheral edema  GI: Soft, NT, ND, no rebound, no guarding; no palpable masses; no hepatosplenomegaly; no hernia palpated  LYMPH: No cervical LAD or tenderness; no axillary LAD or tenderness; no inguinal LAD or tenderness  MSK: Normal gait; No digital clubbing or cyanosis; examination of the (head/neck/spine/ribs/pelvis, RUE, LUE, RLE, LLE) without misalignment,            Normal ROM without pain, no spinal tenderness, normal muscle strength/tone  SKIN: Erythematous rash on posterior scalp    Neurologic:     -Mental Status: AAOx3. Speech is fluent with intact naming, repetition, and comprehension, no dysarthria. Recent and remote memory intact. Follows commands. Attention/concentration intact. Fund of knowledge appropriate.     -Cranial Nerves:          II: Visual fields are full to confrontation.          III, IV, VI: EOMI. One beat nystagmus on lateral gaze. PERRL b/l          V:  Facial sensation V1-V3 equal and intact           VII: Face is symmetric with normal eye closure and smile          VIII: Hearing is bilaterally intact to finger rub          IX, X: Uvula is midline and soft palate rises symmetrically          XI: Head turning and shoulder shrug are intact.          XII: Tongue protrudes midline     -Motor: Normal bulk and tone. Strength bilateral upper extremity 5/5, bilateral lower extremities 5/5.                     No pronator drift. Rapid alternating movements intact and symmetric     -Sensation: Intact to light touch bilaterally. No neglect or extinction on double simultaneous testing.     -Coordination: No dysmetria on finger-to-nose and heel-to-shin bilaterally     -Reflexes: 1+ Biceps 1+ BR 1+ Patellar 1+ Ankle. Downgoing toes bilaterally      -Gait: Narrow gait and steady

## 2023-02-22 PROCEDURE — 95720 EEG PHY/QHP EA INCR W/VEEG: CPT

## 2023-02-22 PROCEDURE — 99222 1ST HOSP IP/OBS MODERATE 55: CPT

## 2023-02-22 NOTE — PROGRESS NOTE ADULT - ASSESSMENT
22y F PM[facility] epilepsy (2021), HSV (2020) presents for characterization of breakthrough seizures as elective admission to EMU. Seizures are most likely related to medication nonadherence psychosocial stress and are most likely epileptic in nature given description of aura and seizure activity. MRI negative. EEG while on halved keppra regimen and with photostimulation was negative. Holding AED now with sleep deprivation tonight to anticipate seizure.     Plan  #Seizure Characterization  - vEEG x 72 hours  - f/u Keppra level  - No AED today  - Discussed with patient sleep deprivation (2am-7am)  - Maintain Mag>2  - Seizure and fall precautions  - Ativan 2mg IVP for seizure >2 min    #Misc  Fluids: None  Electrolytes: replete as necessary, K>4, Mg>2  Nutrition: Regular  Bowel Regimen: None  DVT ppx: Lovenox 40mg  GI ppx: None  Code: Full  Disposition: EMU

## 2023-02-22 NOTE — PROGRESS NOTE ADULT - SUBJECTIVE AND OBJECTIVE BOX
Neurology Progress Note    Interval History:  The patient was seen and examined at the bedside. Received motrin for menstrual cramps. Slept well after MRI. Mild headache after EEG leads were placed.  Denied seizure activity, speech or visual disturbances.       PAST MEDICAL & SURGICAL HISTORY:  No pertinent past medical history    No significant past surgical history            Medications:  enoxaparin Injectable 40 milliGRAM(s) SubCutaneous every 24 hours  ibuprofen  Tablet. 600 milliGRAM(s) Oral every 6 hours PRN  influenza   Vaccine 0.5 milliLiter(s) IntraMuscular once  LORazepam   Injectable 2 milliGRAM(s) IV Push once PRN  melatonin 3 milliGRAM(s) Oral at bedtime PRN      Vital Signs Last 24 Hrs  T(C): 37.1 (21 Feb 2023 21:00), Max: 37.1 (21 Feb 2023 21:00)  T(F): 98.7 (21 Feb 2023 21:00), Max: 98.7 (21 Feb 2023 21:00)  HR: 88 (21 Feb 2023 21:00) (80 - 88)  BP: 122/72 (21 Feb 2023 21:00) (119/80 - 122/72)  BP(mean): --  RR: 16 (21 Feb 2023 21:00) (16 - 16)  SpO2: 98% (21 Feb 2023 21:00) (98% - 99%)    Parameters below as of 21 Feb 2023 21:00  Patient On (Oxygen Delivery Method): room air        Neurological Exam:   General: Sitting up comfortably in bed  Mental status: Awake, alert and oriented x3.  Recent and remote memory intact.  Naming, repetition and comprehension intact.  Attention/concentration intact.  No dysarthria, no aphasia.  Fund of knowledge appropriate.    Cranial nerves: Pupils equally round and reactive to light, visual fields full, no nystagmus, extraocular muscles intact, V1 through V3 intact bilaterally and symmetric, face symmetric, hearing intact to finger rub, palate elevation symmetric, tongue was midline.  Motor:  MRC grading 5/5 b/l UE/LE.   strength 5/5.  Normal tone and bulk.  No abnormal movements.    Sensation: Intact to light touch. No extinction on double simultaneous.  Coordination: No dysmetria on finger-to-nose  Reflexes: 1+ Biceps 1+ BR 1+ Patellar 1+ Ankle. Downgoing toes bilaterally     Labs:  CBC Full  -  ( 21 Feb 2023 14:51 )  WBC Count : 8.75 K/uL  RBC Count : 4.72 M/uL  Hemoglobin : 13.7 g/dL  Hematocrit : 41.8 %  Platelet Count - Automated : 228 K/uL  Mean Cell Volume : 88.6 fl  Mean Cell Hemoglobin : 29.0 pg  Mean Cell Hemoglobin Concentration : 32.8 gm/dL  Auto Neutrophil # : 6.01 K/uL  Auto Lymphocyte # : 2.13 K/uL  Auto Monocyte # : 0.41 K/uL  Auto Eosinophil # : 0.12 K/uL  Auto Basophil # : 0.05 K/uL  Auto Neutrophil % : 68.7 %  Auto Lymphocyte % : 24.3 %  Auto Monocyte % : 4.7 %  Auto Eosinophil % : 1.4 %  Auto Basophil % : 0.6 %    02-21    140  |  105  |  5<L>  ----------------------------<  111<H>  4.3   |  28  |  0.77    Ca    9.5      21 Feb 2023 14:51  Phos  3.3     02-21  Mg     1.9     02-21    TPro  7.0  /  Alb  4.1  /  TBili  0.8  /  DBili  x   /  AST  27  /  ALT  20  /  AlkPhos  64  02-21    LIVER FUNCTIONS - ( 21 Feb 2023 14:51 )  Alb: 4.1 g/dL / Pro: 7.0 g/dL / ALK PHOS: 64 U/L / ALT: 20 U/L / AST: 27 U/L / GGT: x             EEG report 2/22  Daily Summary:    No epileptiform activity and no significant clinical events occurred.  Unremarkable awake and sleep overnight EEG recording

## 2023-02-23 ENCOUNTER — TRANSCRIPTION ENCOUNTER (OUTPATIENT)
Age: 23
End: 2023-02-23

## 2023-02-23 LAB — LEVETIRACETAM SERPL-MCNC: <2 UG/ML — LOW (ref 10–40)

## 2023-02-23 PROCEDURE — 95720 EEG PHY/QHP EA INCR W/VEEG: CPT

## 2023-02-23 PROCEDURE — 95816 EEG AWAKE AND DROWSY: CPT

## 2023-02-23 PROCEDURE — 99232 SBSQ HOSP IP/OBS MODERATE 35: CPT

## 2023-02-23 PROCEDURE — 85025 COMPLETE CBC W/AUTO DIFF WBC: CPT

## 2023-02-23 PROCEDURE — 93005 ELECTROCARDIOGRAM TRACING: CPT

## 2023-02-23 PROCEDURE — 83735 ASSAY OF MAGNESIUM: CPT

## 2023-02-23 PROCEDURE — 95716 VEEG EA 12-26HR CONT MNTR: CPT

## 2023-02-23 PROCEDURE — 84100 ASSAY OF PHOSPHORUS: CPT

## 2023-02-23 PROCEDURE — 70551 MRI BRAIN STEM W/O DYE: CPT

## 2023-02-23 PROCEDURE — 95700 EEG CONT REC W/VID EEG TECH: CPT

## 2023-02-23 PROCEDURE — 76377 3D RENDER W/INTRP POSTPROCES: CPT

## 2023-02-23 PROCEDURE — 36415 COLL VENOUS BLD VENIPUNCTURE: CPT

## 2023-02-23 PROCEDURE — 80053 COMPREHEN METABOLIC PANEL: CPT

## 2023-02-23 RX ORDER — LEVETIRACETAM 250 MG/1
2 TABLET, FILM COATED ORAL
Qty: 0 | Refills: 0 | DISCHARGE

## 2023-02-23 RX ORDER — LEVETIRACETAM 250 MG/1
2 TABLET, FILM COATED ORAL
Qty: 180 | Refills: 3
Start: 2023-02-23 | End: 2024-02-17

## 2023-02-23 RX ORDER — LEVETIRACETAM 250 MG/1
3000 TABLET, FILM COATED ORAL
Refills: 0 | Status: DISCONTINUED | OUTPATIENT
Start: 2023-02-23 | End: 2023-02-24

## 2023-02-23 RX ADMIN — LEVETIRACETAM 3000 MILLIGRAM(S): 250 TABLET, FILM COATED ORAL at 17:57

## 2023-02-23 NOTE — EEG REPORT - NS EEG TEXT BOX
EEG Report    Day 2  2/22/2023 @ 7 AM to 2/23/2023 @ 7 AM     Pertinent medications: none  Background:  continuous, with predominantly alpha and beta.frequencies.  Symmetry:  No persistent asymmetries of voltage or frequency.  Posterior Dominant Rhythm:  10 Hz symmetric, well-organized, and well-modulated.  Organization: Normal anterior to posterior gradient.  Voltage:  Normal (20+ uV)  Variability: Yes. 		Reactivity: Yes.  N2 sleep: Symmetric, synchronous spindles and K complexes.  Spontaneous Activity:  Rare (<1/hour) low amplitude left and right temporal spikes.  Periodic/rhythmic activity:  None.  Events:  No electrographic seizures or significant clinical events.  Provocations:  Hyperventilation and Photic stimulation: was not performed.    Daily Summary:    Rare left and right temporal low amplitude spikes, indicating increased epileptic potential in these regions.      Jayna Summers MD  Attending Neurologist, [facility]   EEG Report    Day 2  2/22/2023 @ 7 AM to 2/23/2023 @ 7 AM     Pertinent medications: none  Background:  continuous, with predominantly alpha and beta.frequencies.  Symmetry:  No persistent asymmetries of voltage or frequency.  Posterior Dominant Rhythm:  10 Hz symmetric, well-organized, and well-modulated.  Organization: Normal anterior to posterior gradient.  Voltage:  Normal (20+ uV)  Variability: Yes. 		Reactivity: Yes.  N2 sleep: Symmetric, synchronous spindles and K complexes.  Spontaneous Activity:  Rare (<1/hour) small sharp spikes in the left and right temporal regions.  Periodic/rhythmic activity:  None.  Events:  No electrographic seizures or significant clinical events.  Provocations:  Hyperventilation and Photic stimulation: was not performed.    Daily Summary:    Rare left and right temporal small sharp spikes.  While these may be a benign variant, they can also be associated with focal epilepsy, and in the context of her seizure history they may represent increased epileptic potential in the temporal lobes.    Jayna Summers MD  Attending Neurologist, [facility]

## 2023-02-23 NOTE — DISCHARGE NOTE PROVIDER - NSDCFUSCHEDAPPT_GEN_ALL_CORE_FT
Jayna Summers Physician [facility]  NEUROLOGY [address]  Scheduled Appointment: 04/06/2023     [facility]  NEUROLOGY [address]  Scheduled Appointment: 03/09/2023

## 2023-02-23 NOTE — DISCHARGE NOTE PROVIDER - NSDCMRMEDTOKEN_GEN_ALL_CORE_FT
levETIRAcetam 1500 mg oral tablet, extended release: 2 tab(s) orally once a day (at bedtime)    levETIRAcetam 1500 mg oral tablet, extended release: 2 tab(s) orally once a day (at bedtime)   Nayzilam 5 mg/inh nasal spray: 1 puff(s) nasal once, As Needed   levETIRAcetam 1500 mg oral tablet, extended release: 2 tab(s) orally once a day (at bedtime)   Nayzilam 5 mg/inh nasal spray: 1 puff(s) nasal once, As Needed  Nayzilam 5 mg/inh nasal spray: 1 spray(s) nasal once, As Needed MDD:5mg

## 2023-02-23 NOTE — DISCHARGE NOTE PROVIDER - PROVIDER TOKENS
PROVIDER:[TOKEN:[[id number]]] PROVIDER:[TOKEN:[[id number]],FOLLOWUP:[1 month]] PROVIDER:[TOKEN:[[id number]],SCHEDULEDAPPT:[04/06/2023]]

## 2023-02-23 NOTE — DISCHARGE NOTE PROVIDER - HOSPITAL COURSE
#Discharge: do not delete    22y F [facility] epilepsy (2021), HSV, presents for workup of uncontrolled seizures.  Ms. Reyes's first seizure was in July 2019 attributed to be 2/2 emotional stress in prior marriage after which she was intubated and sedated for 3 days and was started on Keppra (500mg BID). She was officially diagnosed with epilepsy in 2021 after an EEG study with Dr. Jeff Watkins in [place] at that time her keppra was increased to 2000mg ER qhs as she would often forget the BID dosing and still had seizures. She moved to [place]  9/2022 during which time underwent more significant life-stressors, would sometimes forget AED. Had 10 total seizures between September and December. In December 2022 she was fully adherent to her Keppra but continued to have seizures, she had seizure witnessed on security footage at work: she felt unwell, took a nap and appeared to wake up, stretch arm then shook her body. Last seizure was 2/16. She has missed four keppra doses in the past 3 weeks. Her seizures are described by her partner as staring episodes with stuttered or slurred speech ?mouth chewing, biting tongue that occasional may progress to full body convulsion. Her aura is a full body "euphoric" flushing and lightheaded sensation. Last Keppra dose was 2/19 evening (in preparation for this admission). Seizures were sometimes i/s/o nonadherance and psychosocial stress but also occurred in December while fully complaint. MRI this admission was unremarkable. EEG while holding AED with sleep deprivation demonstrated bilateral temporal spikes. Given that in December she had seizures despite full compliance to Keppra, her Keppra was resumed at higher dose (3000mg ER).     New medications/therapies: Keppra 3000mg ER  New lines/hardware: None  Items to be followed outpatient: Outpatient epilepsy appointment  Discharge plan: discharge to home     #Discharge: do not delete    22y F [facility] epilepsy (2021), HSV, presents for workup of uncontrolled seizures.  Ms. Reyes's first seizure was in July 2019 attributed to be 2/2 emotional stress in prior marriage after which she was intubated and sedated for 3 days and was started on Keppra (500mg BID). She was officially diagnosed with epilepsy in 2021 after an EEG study with Dr. Jeff Watkins in [place] at that time her keppra was increased to 2000mg ER qhs as she would often forget the BID dosing and still had seizures. She moved to [place]  9/2022 during which time underwent more significant life-stressors, would sometimes forget AED. Had 10 total seizures between September and December. In December 2022 she was fully adherent to her Keppra but continued to have seizures, she had seizure witnessed on security footage at work: she felt unwell, took a nap and appeared to wake up, stretch arm then shook her body. Last seizure was 2/16. She has missed four keppra doses in the past 3 weeks. Her seizures are described by her partner as staring episodes with stuttered or slurred speech ?mouth chewing, biting tongue that occasional may progress to full body convulsion. Her aura is a full body "euphoric" flushing and lightheaded sensation. Last Keppra dose was 2/19 evening (in preparation for this admission). Seizures were sometimes i/s/o nonadherance and psychosocial stress but also occurred in December while fully complaint. MRI this admission was unremarkable. EEG while holding AED with sleep deprivation demonstrated rare bilateral temporal spikes. Given that in December she had seizures despite full compliance to Keppra, her Keppra was resumed at higher dose (3000mg ER).     EEG report 2/23 (sleep deprivation)  Rare left and right temporal small sharp spikes.  While these may be a benign variant, they can also be associated with focal epilepsy, and in the context of her seizure history they may represent increased epileptic potential in the temporal lobes.    EEG report 2/22  No epileptiform activity and no significant clinical events occurred.  Unremarkable awake and sleep overnight EEG recording    New medications/therapies: Keppra 3000mg ER  New lines/hardware: None  Items to be followed outpatient: Outpatient epilepsy appointment  Discharge plan: discharge to home     #Discharge: do not delete    22y F [facility] epilepsy (2021), HSV, presents for workup of uncontrolled seizures.  Ms. Reyes's first seizure was in July 2019 attributed to be 2/2 emotional stress in prior marriage after which she was intubated and sedated for 3 days and was started on Keppra (500mg BID). She was officially diagnosed with epilepsy in 2021 after an EEG study with Dr. Jeff Watkins in [place] at that time her keppra was increased to 2000mg ER qhs as she would often forget the BID dosing and still had seizures. She moved to [place]  9/2022 during which time underwent more significant life-stressors, would sometimes forget AED. Had 10 total seizures between September and December. In December 2022 she was fully adherent to her Keppra but continued to have seizures, she had seizure witnessed on security footage at work: she felt unwell, took a nap and appeared to wake up, stretch arm then shook her body. Last seizure was 2/16. She has missed four keppra doses in the past 3 weeks. Her seizures are described by her partner as staring episodes with stuttered or slurred speech ?mouth chewing, biting tongue that occasional may progress to full body convulsion. Her aura is a full body "euphoric" flushing and lightheaded sensation. Last Keppra dose was 2/19 evening (in preparation for this admission). Seizures were sometimes i/s/o nonadherance and psychosocial stress but also occurred in December while fully complaint. MRI this admission was unremarkable. EEG while holding AED with sleep deprivation demonstrated rare bilateral temporal spikes. Given that in December she had seizures despite full compliance to Keppra. On 2/23 around 5:16pm she had generalized seizure 1.5 minutes with 10min postictal confusion. Her keppra was given shortly after the seizure, resumed at higher dose (3000mg ER). She did not have prior rescue medication, which will be sent upon discharge.     EEG report 2/23 (sleep deprivation)  Rare left and right temporal small sharp spikes.  While these may be a benign variant, they can also be associated with focal epilepsy, and in the context of her seizure history they may represent increased epileptic potential in the temporal lobes.    EEG report 2/22  No epileptiform activity and no significant clinical events occurred.  Unremarkable awake and sleep overnight EEG recording    New medications/therapies: Keppra 3000mg ER, Nayzilam 5mg rescue   New lines/hardware: None  Items to be followed outpatient: Outpatient epilepsy appointment  Discharge plan: discharge to home     22-year-old woman with epilepsy, ?HSV presenting for work up of uncontrolled seizures.    Ms. Reyes's first seizure was in July 2019 attributed to be 2/2 emotional stress in prior marriage after which she was intubated and sedated for 3 days and was started on Keppra (500mg BID). She was officially diagnosed with epilepsy in 2021 after an EEG study with Dr. Jeff Watkins in [place] at that time her keppra was increased to 2000mg ER qhs as she would often forget the BID dosing and still had seizures. She moved to [place]  9/2022 during which time underwent more significant life-stressors, would sometimes forget AED. Had 10 total seizures between September and December. In December 2022 she was fully adherent to her Keppra but continued to have seizures, she had seizure witnessed on security footage at work: she felt unwell, took a nap and appeared to wake up, stretch arm then shook her body. Last seizure was 2/16. She has missed four keppra doses in the past 3 weeks. Her seizures are described by her partner as staring episodes with stuttered or slurred speech ?mouth chewing, biting tongue that occasional may progress to full body convulsion. Her aura is a full body "euphoric" flushing and lightheaded sensation. Last Keppra dose was 2/19 evening (in preparation for this admission). Seizures were sometimes i/s/o nonadherance and psychosocial stress but also occurred in December while fully complaint. MRI this admission was unremarkable. EEG while holding AED with sleep deprivation demonstrated rare bilateral temporal small sharp spikes. Given that in December she had seizures despite full compliance to Keppra. On 2/23 around 5:16pm she had generalized seizure lasting 1.5 minutes, with probable left temporal onset, with 10min postictal confusion. Her keppra was given shortly after the seizure, resumed at higher dose (3000mg ER). She did not have prior rescue medication, which will be sent upon discharge.     New medications/therapies: Keppra 3000mg ER, Nayzilam 5mg rescue   New lines/hardware: None  Items to be followed outpatient: Outpatient epilepsy appointment  Discharge plan: discharge to home

## 2023-02-23 NOTE — PROVIDER CONTACT NOTE (FALL NOTIFICATION) - ASSESSMENT
/65,  Axillary 96.7, Hr 116, oxygen 98% on ra  while sitting at the edge of the bed alarm on call bell in reach working on her computer , pt was sizing and was trapped between the table and bed DAVID Corona move the table and pt was assited sliding to the ground /65,  Axillary 96.7, Hr 116, oxygen 98% on ra  while sitting at the edge of the bed alarm on call bell in reach working on her computer , pt was sizing and was trapped between the table and bed DAVDI Corona move the table and pt was assited sliding to the ground, pt was having oral secretions and was placed on her side

## 2023-02-23 NOTE — DISCHARGE NOTE PROVIDER - CARE PROVIDERS DIRECT ADDRESSES
,[email or web address]

## 2023-02-23 NOTE — DISCHARGE NOTE PROVIDER - NSDCCPCAREPLAN_GEN_ALL_CORE_FT
PRINCIPAL DISCHARGE DIAGNOSIS  Diagnosis: Epilepsy  Assessment and Plan of Treatment:   Do not drive for the next 6 months. Keppra 3000mg ER was sent to the pharmacy.       PRINCIPAL DISCHARGE DIAGNOSIS  Diagnosis: Epilepsy  Assessment and Plan of Treatment: Seizures are caused by abnormal patterns of electrical signals in the brain. They are different for each person. Seizures can affect movement, speech, vision, or awareness. Some people have only slight shaking of a hand and do not pass out. Other people may pass out and have shaking of the whole body. Some people appear to stare into space. They are awake, but they can't respond normally. Later, they may not remember what happened. You may need tests to identify the type and cause of the seizures. A seizure may occur only once, or you may have them more than one time. Taking medicines as directed and following up with your doctor may help keep you from having more seizures.  EEG testing was done which showed spike activity in your right and left temporal lobe. Combined with your aura sensation, your seizures most likely are from  We increased your home keppra dose to Keppra 3000mg ER. Please do not drive for the next 6 months, and maintain a good sleep schedule, avoid alcohol. Please see Dr. Summers in 4-6 weeks (clinic will call you for an appointment).       PRINCIPAL DISCHARGE DIAGNOSIS  Diagnosis: Epilepsy  Assessment and Plan of Treatment: Seizures are caused by abnormal patterns of electrical signals in the brain. They are different for each person. Seizures can affect movement, speech, vision, or awareness. Some people have only slight shaking of a hand and do not pass out. Other people may pass out and have shaking of the whole body. Some people appear to stare into space. They are awake, but they can't respond normally. Later, they may not remember what happened. You may need tests to identify the type and cause of the seizures. A seizure may occur only once, or you may have them more than one time. Taking medicines as directed and following up with your doctor may help keep you from having more seizures.  EEG testing was done which showed spike activity in your right and left temporal lobe. Combined with your aura sensation, your seizures most likely are from  We increased your home keppra dose to Keppra 3000mg ER. Nayzilam rescue spray was sent to your pharmacy. Please do not drive for the next 6 months, and maintain a good sleep schedule, avoid alcohol. Please see Dr. Summers in 4-6 weeks (clinic will call you for an appointment).

## 2023-02-23 NOTE — DISCHARGE NOTE PROVIDER - CARE PROVIDER_API CALL
Ez Mcgill)  Neurology  [address] [place] [address]  Phone: [phone]  Fax: [phone]  Follow Up Time:    Jayna Summers)  Neurology  [address] [place] [address]  Phone: [phone]  Fax: [phone]  Follow Up Time: 1 month   Jayna Summers)  Neurology  [address] [place] [address]  Phone: [phone]  Fax: [phone]  Scheduled Appointment: 04/06/2023

## 2023-02-23 NOTE — CHART NOTE - NSCHARTNOTEFT_GEN_A_CORE
At approximately 5:16pm, Patient was reportedly by RN at the edge of the bed convulsing for approximately 2mins, and she aided her to slid to the floor. There were no head collision with the floor, and she was assisted back to bed with several nurses and this provider. She was initially confused for approximately 10mins but came to slowly and was able to converse. Bedside RN was informed to administer Keppra 3000mg at that time. Vital signs were stable when checked after patient was assisted back to bed. No further interventions at this time. Dr Summers made aware.

## 2023-02-23 NOTE — PROVIDER CONTACT NOTE (FALL NOTIFICATION) - SITUATION
Pt had an episode of seizure lasted 2 minutes, while sitting at the edge of the bed alarm on call bell in reach working on her computer , pt was sizing and was trapped between the RN Keren Corona

## 2023-02-23 NOTE — DISCHARGE NOTE PROVIDER - ATTENDING DISCHARGE PHYSICAL EXAMINATION:
Awake and alert, speech fluent and prosodic with no paraphasic errors, face symmetric, no dysarthria, moves all 4 extremities symmetrically with good strength

## 2023-02-23 NOTE — PROGRESS NOTE ADULT - ASSESSMENT
22y F [facility] epilepsy (2021), HSV (2020) presented for characterization of breakthrough seizures as elective admission to EMU. Seizures were sometimes i/s/o nonadherance and psychosocial stress but also occurred in December while fully complaint. MRI this admission was unremarkable. Seizures were most likely epileptic in nature given description of aura and seizure activity which was ultimately seen on EEG after holding AED with sleep deprivation demonstrating bilateral temporal spikes. Given in December she had seizures despite full compliance to Keppra, will resume tonight at higher dose (3000mg ER).     Plan  #Seizure Characterization  - Continue vEEG  - Start Keppra 3000mg ER tonight  - Plan for DC tomorrow   - f/u Keppra level  - Maintain Mag>2  - Seizure and fall precautions  - Ativan 2mg IVP for seizure >2 min    #Misc  Fluids: None  Electrolytes: replete as necessary, K>4, Mg>2  Nutrition: Regular  Bowel Regimen: None  DVT ppx: Lovenox 40mg  GI ppx: None  Code: Full  Disposition: Dc tomorrow

## 2023-02-23 NOTE — PROGRESS NOTE ADULT - ATTENDING COMMENTS
Overnight EEG shows rare small sharp spikes in the bilateral temporal lobes.  While these have historically been considered a benign variant, there is increasing evidence that they may be indicators of increased epileptic potential in temporal lobe epilepsy.  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  As she has had breakthrough seizures while on Keppra XR 2g daily, will recommend increasing to 3g daily starting tonight.  Will plan for discharge home tomorrow with close outpatient follow up.  Driving restrictions were reviewed.
see attestation to H&P dated 2/21/2023.

## 2023-02-23 NOTE — PROGRESS NOTE ADULT - SUBJECTIVE AND OBJECTIVE BOX
Neurology Progress Note    Interval History:  The patient was seen and examined at the bedside. Stayed up until 2am, dozed off then awoke for 4am prior to going to sleep until 7am, awoke with some nausea. Denied seizure activity, speech or visual disturbances aura sensations.       PAST MEDICAL & SURGICAL HISTORY:  No pertinent past medical history    No significant past surgical history            Medications:  enoxaparin Injectable 40 milliGRAM(s) SubCutaneous every 24 hours  ibuprofen  Tablet. 600 milliGRAM(s) Oral every 6 hours PRN  influenza   Vaccine 0.5 milliLiter(s) IntraMuscular once  LORazepam   Injectable 2 milliGRAM(s) IV Push once PRN  melatonin 3 milliGRAM(s) Oral at bedtime PRN      Vital Signs Last 24 Hrs  T(C): 36.9 (23 Feb 2023 06:14), Max: 36.9 (23 Feb 2023 06:14)  T(F): 98.4 (23 Feb 2023 06:14), Max: 98.4 (23 Feb 2023 06:14)  HR: 74 (23 Feb 2023 06:14) (74 - 83)  BP: 100/62 (23 Feb 2023 06:14) (100/62 - 118/73)  BP(mean): --  RR: 17 (23 Feb 2023 06:14) (16 - 18)  SpO2: 100% (23 Feb 2023 06:14) (100% - 100%)    Parameters below as of 23 Feb 2023 06:14  Patient On (Oxygen Delivery Method): room air      Neurological Exam:   General: Sitting a edge of bed eating breakfast. EEG leads in place.  Mental status: Awake, alert and oriented x3.  Recent and remote memory intact.  Naming, repetition and comprehension intact.  Attention/concentration intact.  No dysarthria, no aphasia.  Fund of knowledge appropriate.    Cranial nerves: PERRL 5mm b/l with mild photophobia. EOMI No nystagmus. visual fields full. V1-V3 intact bilaterally. Hearing intact to finger rub, palate elevation symmetric, tongue was midline.  Motor:  MRC grading 5/5 b/l UE/LE.   strength 5/5.  Normal tone and bulk.  No abnormal movements.    Sensation: Intact to light touch. No extinction on double simultaneous.  Coordination: No dysmetria on finger-to-nose  Reflexes: Symmetric: 1+ Biceps and BR 1+ Patellar 1+ Ankle. Downgoing toes bilaterally       Labs:  CBC Full  -  ( 21 Feb 2023 14:51 )  WBC Count : 8.75 K/uL  RBC Count : 4.72 M/uL  Hemoglobin : 13.7 g/dL  Hematocrit : 41.8 %  Platelet Count - Automated : 228 K/uL  Mean Cell Volume : 88.6 fl  Mean Cell Hemoglobin : 29.0 pg  Mean Cell Hemoglobin Concentration : 32.8 gm/dL  Auto Neutrophil # : 6.01 K/uL  Auto Lymphocyte # : 2.13 K/uL  Auto Monocyte # : 0.41 K/uL  Auto Eosinophil # : 0.12 K/uL  Auto Basophil # : 0.05 K/uL  Auto Neutrophil % : 68.7 %  Auto Lymphocyte % : 24.3 %  Auto Monocyte % : 4.7 %  Auto Eosinophil % : 1.4 %  Auto Basophil % : 0.6 %    02-21    140  |  105  |  5<L>  ----------------------------<  111<H>  4.3   |  28  |  0.77    Ca    9.5      21 Feb 2023 14:51  Phos  3.3     02-21  Mg     1.9     02-21    TPro  7.0  /  Alb  4.1  /  TBili  0.8  /  DBili  x   /  AST  27  /  ALT  20  /  AlkPhos  64  02-21    LIVER FUNCTIONS - ( 21 Feb 2023 14:51 )  Alb: 4.1 g/dL / Pro: 7.0 g/dL / ALK PHOS: 64 U/L / ALT: 20 U/L / AST: 27 U/L / GGT: x                Neurology Progress Note    Interval History:    The patient was seen and examined at the bedside. Stayed up until 2am, dozed off then awoke for 4am prior to going to sleep until 7am, awoke with some nausea. Denied seizure activity, speech or visual disturbances aura sensations.     Medications:  enoxaparin Injectable 40 milliGRAM(s) SubCutaneous every 24 hours  ibuprofen  Tablet. 600 milliGRAM(s) Oral every 6 hours PRN  influenza   Vaccine 0.5 milliLiter(s) IntraMuscular once  LORazepam   Injectable 2 milliGRAM(s) IV Push once PRN  melatonin 3 milliGRAM(s) Oral at bedtime PRN    Vital Signs Last 24 Hrs  T(C): 36.9 (23 Feb 2023 06:14), Max: 36.9 (23 Feb 2023 06:14)  T(F): 98.4 (23 Feb 2023 06:14), Max: 98.4 (23 Feb 2023 06:14)  HR: 74 (23 Feb 2023 06:14) (74 - 83)  BP: 100/62 (23 Feb 2023 06:14) (100/62 - 118/73)  BP(mean): --  RR: 17 (23 Feb 2023 06:14) (16 - 18)  SpO2: 100% (23 Feb 2023 06:14) (100% - 100%)    Parameters below as of 23 Feb 2023 06:14  Patient On (Oxygen Delivery Method): room air      Neurological Exam:   General: Sitting a edge of bed eating breakfast. EEG leads in place.  Mental status: Awake, alert and oriented x3.  Recent and remote memory intact.  Naming, repetition and comprehension intact.  Attention/concentration intact.  No dysarthria, no aphasia.  Fund of knowledge appropriate.    Cranial nerves: PERRL 5mm b/l with mild photophobia. EOMI No nystagmus. visual fields full. V1-V3 intact bilaterally. Hearing intact to finger rub, palate elevation symmetric, tongue was midline.  Motor:  MRC grading 5/5 b/l UE/LE.   strength 5/5.  Normal tone and bulk.  No abnormal movements.    Sensation: Intact to light touch. No extinction on double simultaneous.  Coordination: No dysmetria on finger-to-nose  Reflexes: Symmetric: 1+ Biceps and BR 1+ Patellar 1+ Ankle. Downgoing toes bilaterally     Labs:      140  |  105  |  5<L>  ----------------------------<  111<H>  4.3   |  28  |  0.77    Ca    9.5      21 Feb 2023 14:51  Phos  3.3     02-21  Mg     1.9     02-21    TPro  7.0  /  Alb  4.1  /  TBili  0.8  /  DBili  x   /  AST  27  /  ALT  20  /  AlkPhos  64  02-21    LIVER FUNCTIONS - ( 21 Feb 2023 14:51 )  Alb: 4.1 g/dL / Pro: 7.0 g/dL / ALK PHOS: 64 U/L / ALT: 20 U/L / AST: 27 U/L / GGT: x             EEG:    Daily Summary:    Rare left and right temporal small sharp spikes.  While these may be a benign variant, they can also be associated with focal epilepsy, and in the context of her seizure history they may represent increased epileptic potential in the temporal lobes.    Jayna Summers MD  Attending Neurologist, [facility]

## 2023-02-24 ENCOUNTER — TRANSCRIPTION ENCOUNTER (OUTPATIENT)
Age: 23
End: 2023-02-24

## 2023-02-24 VITALS
TEMPERATURE: 98 F | DIASTOLIC BLOOD PRESSURE: 68 MMHG | HEART RATE: 96 BPM | SYSTOLIC BLOOD PRESSURE: 104 MMHG | RESPIRATION RATE: 16 BRPM | OXYGEN SATURATION: 100 %

## 2023-02-24 PROBLEM — Z78.9 OTHER SPECIFIED HEALTH STATUS: Chronic | Status: ACTIVE | Noted: 2023-02-21

## 2023-02-24 PROCEDURE — 95720 EEG PHY/QHP EA INCR W/VEEG: CPT

## 2023-02-24 RX ORDER — MIDAZOLAM HYDROCHLORIDE 1 MG/ML
1 INJECTION, SOLUTION INTRAMUSCULAR; INTRAVENOUS
Qty: 0 | Refills: 0 | DISCHARGE

## 2023-02-24 RX ORDER — MIDAZOLAM HYDROCHLORIDE 1 MG/ML
1 INJECTION, SOLUTION INTRAMUSCULAR; INTRAVENOUS
Qty: 1 | Refills: 2
Start: 2023-02-24 | End: 2023-02-26

## 2023-02-24 NOTE — DISCHARGE NOTE NURSING/CASE MANAGEMENT/SOCIAL WORK - NSDCPEFALRISK_GEN_ALL_CORE
For information on Fall & Injury Prevention, visit: [email or web address]  [email or web address] OR  https://www.cdc.gov/steadi/patient.html

## 2023-02-27 RX ORDER — LEVETIRACETAM 500 MG/1
500 TABLET, FILM COATED ORAL
Refills: 0 | Status: DISCONTINUED | COMMUNITY
End: 2023-02-27

## 2023-03-01 DIAGNOSIS — B00.9 HERPESVIRAL INFECTION, UNSPECIFIED: ICD-10-CM

## 2023-03-01 DIAGNOSIS — Z91.14 PATIENT'S OTHER NONCOMPLIANCE WITH MEDICATION REGIMEN: ICD-10-CM

## 2023-03-01 DIAGNOSIS — Z72.820 SLEEP DEPRIVATION: ICD-10-CM

## 2023-03-01 DIAGNOSIS — G40.909 EPILEPSY, UNSPECIFIED, NOT INTRACTABLE, WITHOUT STATUS EPILEPTICUS: ICD-10-CM

## 2023-03-01 DIAGNOSIS — F12.90 CANNABIS USE, UNSPECIFIED, UNCOMPLICATED: ICD-10-CM

## 2023-03-01 DIAGNOSIS — Z73.3 STRESS, NOT ELSEWHERE CLASSIFIED: ICD-10-CM

## 2023-03-01 DIAGNOSIS — R21 RASH AND OTHER NONSPECIFIC SKIN ERUPTION: ICD-10-CM

## 2023-03-01 DIAGNOSIS — N94.6 DYSMENORRHEA, UNSPECIFIED: ICD-10-CM

## 2023-03-09 ENCOUNTER — APPOINTMENT (OUTPATIENT)
Dept: NEUROLOGY | Facility: CLINIC | Age: 23
End: 2023-03-09

## 2023-03-21 NOTE — EEG REPORT - NS EEG TEXT BOX
[facility] Department of Neurology  Epilepsy Monitoring Unit video-Electroencephalogram    Patient Name:	MANDO MENDEZ    :	2000  MRN:	[id number]    Study Start Date/Time:  2023, 4:03:18 PM  Study End Date/Time: in progress    Referred by: Ez Mcgill MD    Brief Clinical History:  MANDO MENDEZ is a 22 year old Female; study performed to investigate for seizures or markers of epilepsy.    Diagnosis Code:   R56.9 convulsions/seizure    Pertinent Medications:  Keppra XR 2g QHS (holding)    Acquisition Details:  Electroencephalography was acquired using a minimum of 21 channels on an [employer] system v 9.3.1 with electrode placement according to the standard International 10-20 system following ACNS (American Clinical Neurophysiology Society) guidelines for Long-Term Video EEG monitoring.  Anterior temporal T1 and T2 electrodes were utilized whenever possible.   The [employer] automated spike & seizure detections were all reviewed in detail, in addition to extensive portions of raw EEG.  The live video was monitored continuously by trained technicians to identify events and specialty nurses trained in seizure management supervised the care of the patient in the epilepsy unit.    Day 1: 2023 @ 4:03:18 PM to next morning @ 07:00 AM    Background:  continuous, with predominantly alpha and beta.frequencies.  Symmetry:  No persistent asymmetries of voltage or frequency.  Posterior Dominant Rhythm:  10 Hz symmetric, well-organized, and well-modulated.  Organization: Normal anterior to posterior gradient.  Voltage:  Normal (20+ uV)  Variability: Yes. 		Reactivity: Yes.  N2 sleep: Symmetric, synchronous spindles and K complexes.  Spontaneous Activity:  No epileptiform discharges.  Periodic/rhythmic activity:  None.  Events:  No electrographic seizures or significant clinical events.  Provocations:  Hyperventilation and Photic stimulation: was not performed.    Daily Summary:    No epileptiform activity and no significant clinical events occurred.  Unremarkable awake and sleep overnight EEG recording          Jayna Summers MD  Attending Neurologist, [facility] Epilepsy [facility]   Render In Strict Bullet Format?: No Detail Level: Zone Continue Regimen: Triamcinolone ointment 0.1% apply once daily x 7 days Isotretinoin Counseling: Patient should get monthly blood tests, not donate blood, not drive at night if vision affected, not share medication, and not undergo elective surgery for 6 months after tx completed. Side effects reviewed, pt to contact office should one occur.

## 2023-09-07 RX ORDER — LEVETIRACETAM 750 MG/1
750 TABLET, EXTENDED RELEASE ORAL
Qty: 120 | Refills: 5 | Status: ACTIVE | COMMUNITY
Start: 2023-02-27 | End: 1900-01-01

## 2024-10-08 NOTE — PATIENT PROFILE ADULT - FALL HARM RISK - FALLEN IN PAST
Dhruv Hernandez, Krupa Mercado, OSWALDO  Please schedule patient for surgery:  Robotic assisted ventral hernia repair with mesh, possible open  Block time  No clearance needed  
No